# Patient Record
Sex: FEMALE
[De-identification: names, ages, dates, MRNs, and addresses within clinical notes are randomized per-mention and may not be internally consistent; named-entity substitution may affect disease eponyms.]

---

## 2023-02-20 PROBLEM — Z00.00 ENCOUNTER FOR PREVENTIVE HEALTH EXAMINATION: Status: ACTIVE | Noted: 2023-02-20

## 2023-02-21 ENCOUNTER — APPOINTMENT (OUTPATIENT)
Dept: ORTHOPEDIC SURGERY | Facility: CLINIC | Age: 63
End: 2023-02-21

## 2023-06-27 ENCOUNTER — APPOINTMENT (OUTPATIENT)
Dept: ORTHOPEDIC SURGERY | Facility: CLINIC | Age: 63
End: 2023-06-27
Payer: COMMERCIAL

## 2023-06-27 VITALS — BODY MASS INDEX: 31.4 KG/M2 | WEIGHT: 212 LBS | HEIGHT: 69 IN

## 2023-06-27 DIAGNOSIS — M25.641 STIFFNESS OF RIGHT HAND, NOT ELSEWHERE CLASSIFIED: ICD-10-CM

## 2023-06-27 DIAGNOSIS — M65.332 TRIGGER FINGER, LEFT MIDDLE FINGER: ICD-10-CM

## 2023-06-27 PROCEDURE — 73130 X-RAY EXAM OF HAND: CPT | Mod: LT

## 2023-06-27 PROCEDURE — 99204 OFFICE O/P NEW MOD 45 MIN: CPT | Mod: 25

## 2023-06-27 PROCEDURE — 20550 NJX 1 TENDON SHEATH/LIGAMENT: CPT | Mod: 50

## 2023-06-27 NOTE — HISTORY OF PRESENT ILLNESS
[de-identified] : 6/27/2023: rhd 62 yo here with one year complaint of hand pain and difficulty flexing  her right middle finger \par There is no hx of trauma.\par Pt states she was recently diagnosed with RA.\par Pt also complains of intermittent locking of the left ring finger.\par \par \par Allergies: NKDA.

## 2023-06-27 NOTE — ASSESSMENT
[FreeTextEntry1] : We reviewed the anatomy of the flexor sheath and pathology of trigger fingers with the use of drawings and discussion.  We discussed the treatment options including splinting/nsaids, injection and surgery.  We discussed that too many injections may lead to weakening o the tendon/tendon rupture and the safety of two injections. After a discussion of the risks, benefits and alternatives along with the expectations, the patient was amenable to injection.  The indication for injection is pain and inflammation.  The skin overlying the tendon sheath/A1 pulley site was prepared with alcohol and ethyl chloride was sprayed topically.  Sterile technique was used. An injection of 1ml of lidocaine and 6mg of betamethasone was used.  The patient was instructed to call if redness, pain or fever occur.  They ay apply ice for 15 minutes eery hour for the next 12-24 hours as tolerated.  .  The patient understands that it may take 2-5 days to see a noticeable difference.  Sterile Band-Aid was applied.\par \par Pt was given 1st TF CSI in b/l MF's

## 2023-06-27 NOTE — IMAGING
[Bilateral] : hand bilaterally [de-identified] : right middle finger with flexion contracture 15 deg of the PIP joint.\par There is ttp over the A1 pulley with no significant triggering.\par Left ring finger with mild triggering and ttp over the A1 pulley.\par All digits are nvi and strength is 5/5. \par \par Bilateral index and middle finger extensor tendons are noted to sublux with flexion.\par \par Bilateral wrists with full and pain free ROM and 5/5 strength.\par There is no ttp over either wrist. \par \par ttp and triggering in left middle finger\par

## 2024-01-09 ENCOUNTER — APPOINTMENT (OUTPATIENT)
Dept: ORTHOPEDIC SURGERY | Facility: CLINIC | Age: 64
End: 2024-01-09
Payer: COMMERCIAL

## 2024-01-09 DIAGNOSIS — M65.342 TRIGGER FINGER, LEFT RING FINGER: ICD-10-CM

## 2024-01-09 DIAGNOSIS — M65.331 TRIGGER FINGER, RIGHT MIDDLE FINGER: ICD-10-CM

## 2024-01-09 PROCEDURE — 99214 OFFICE O/P EST MOD 30 MIN: CPT | Mod: 25

## 2024-01-09 PROCEDURE — 20550 NJX 1 TENDON SHEATH/LIGAMENT: CPT | Mod: 50
